# Patient Record
Sex: MALE
[De-identification: names, ages, dates, MRNs, and addresses within clinical notes are randomized per-mention and may not be internally consistent; named-entity substitution may affect disease eponyms.]

---

## 2019-02-12 ENCOUNTER — HOSPITAL ENCOUNTER (EMERGENCY)
Dept: HOSPITAL 14 - H.ER | Age: 34
Discharge: LEFT BEFORE DISCHARGE | End: 2019-02-12
Payer: SELF-PAY

## 2019-02-12 VITALS
OXYGEN SATURATION: 100 % | DIASTOLIC BLOOD PRESSURE: 63 MMHG | SYSTOLIC BLOOD PRESSURE: 126 MMHG | TEMPERATURE: 98.2 F | HEART RATE: 51 BPM | RESPIRATION RATE: 17 BRPM

## 2019-02-12 DIAGNOSIS — Z02.89: Primary | ICD-10-CM

## 2019-02-13 ENCOUNTER — HOSPITAL ENCOUNTER (EMERGENCY)
Dept: HOSPITAL 14 - H.ER | Age: 34
Discharge: HOME | End: 2019-02-13
Payer: COMMERCIAL

## 2019-02-13 VITALS
DIASTOLIC BLOOD PRESSURE: 66 MMHG | SYSTOLIC BLOOD PRESSURE: 125 MMHG | HEART RATE: 62 BPM | TEMPERATURE: 98 F | RESPIRATION RATE: 18 BRPM

## 2019-02-13 VITALS — OXYGEN SATURATION: 98 %

## 2019-02-13 DIAGNOSIS — R10.9: Primary | ICD-10-CM

## 2019-02-13 LAB
ALBUMIN SERPL-MCNC: 4.4 G/DL (ref 3.5–5)
ALBUMIN/GLOB SERPL: 1.6 {RATIO} (ref 1–2.1)
ALT SERPL-CCNC: 26 U/L (ref 21–72)
AST SERPL-CCNC: 33 U/L (ref 17–59)
BACTERIA #/AREA URNS HPF: (no result) /[HPF]
BASOPHILS # BLD AUTO: 0 K/UL (ref 0–0.2)
BASOPHILS NFR BLD: 0.5 % (ref 0–2)
BILIRUB UR-MCNC: NEGATIVE MG/DL
BUN SERPL-MCNC: 14 MG/DL (ref 9–20)
CALCIUM SERPL-MCNC: 9.3 MG/DL (ref 8.4–10.2)
COLOR UR: YELLOW
EOSINOPHIL # BLD AUTO: 0 K/UL (ref 0–0.7)
EOSINOPHIL NFR BLD: 0.7 % (ref 0–4)
ERYTHROCYTE [DISTWIDTH] IN BLOOD BY AUTOMATED COUNT: 12.6 % (ref 11.5–14.5)
GFR NON-AFRICAN AMERICAN: > 60
GLUCOSE UR STRIP-MCNC: (no result) MG/DL
HGB BLD-MCNC: 14.9 G/DL (ref 12–18)
LEUKOCYTE ESTERASE UR-ACNC: (no result) LEU/UL
LYMPHOCYTES # BLD AUTO: 1.4 K/UL (ref 1–4.3)
LYMPHOCYTES NFR BLD AUTO: 24.2 % (ref 20–40)
MCH RBC QN AUTO: 31 PG (ref 27–31)
MCHC RBC AUTO-ENTMCNC: 34.2 G/DL (ref 33–37)
MCV RBC AUTO: 90.5 FL (ref 80–94)
MONOCYTES # BLD: 0.4 K/UL (ref 0–0.8)
MONOCYTES NFR BLD: 7.7 % (ref 0–10)
NEUTROPHILS # BLD: 3.8 K/UL (ref 1.8–7)
NEUTROPHILS NFR BLD AUTO: 66.9 % (ref 50–75)
NRBC BLD AUTO-RTO: 0 % (ref 0–0)
PH UR STRIP: 8 [PH] (ref 5–8)
PLATELET # BLD: 206 K/UL (ref 130–400)
PMV BLD AUTO: 8.6 FL (ref 7.2–11.7)
PROT UR STRIP-MCNC: NEGATIVE MG/DL
RBC # BLD AUTO: 4.8 MIL/UL (ref 4.4–5.9)
RBC # UR STRIP: NEGATIVE /UL
SP GR UR STRIP: 1.02 (ref 1–1.03)
URINE CLARITY: (no result)
UROBILINOGEN UR-MCNC: (no result) MG/DL (ref 0.2–1)
WBC # BLD AUTO: 5.7 K/UL (ref 4.8–10.8)

## 2019-02-13 PROCEDURE — 87086 URINE CULTURE/COLONY COUNT: CPT

## 2019-02-13 PROCEDURE — 85025 COMPLETE CBC W/AUTO DIFF WBC: CPT

## 2019-02-13 PROCEDURE — 99285 EMERGENCY DEPT VISIT HI MDM: CPT

## 2019-02-13 PROCEDURE — 87491 CHLMYD TRACH DNA AMP PROBE: CPT

## 2019-02-13 PROCEDURE — 87591 N.GONORRHOEAE DNA AMP PROB: CPT

## 2019-02-13 PROCEDURE — 80053 COMPREHEN METABOLIC PANEL: CPT

## 2019-02-13 PROCEDURE — 74176 CT ABD & PELVIS W/O CONTRAST: CPT

## 2019-02-13 PROCEDURE — 96360 HYDRATION IV INFUSION INIT: CPT

## 2019-02-13 PROCEDURE — 81003 URINALYSIS AUTO W/O SCOPE: CPT

## 2019-02-13 NOTE — ED PDOC
HPI: Abdomen


Time Seen by Provider: 02/13/19 21:07


Chief Complaint (Nursing): Back Pain


Chief Complaint (Provider): flank pain


History Per: Patient


History/Exam Limitations: no limitations


Onset/Duration Of Symptoms: Days (1 week), Waxing/Waning


Current Symptoms Are (Timing): Still Present


Associated Symptoms: Urinary Symptoms


Additional Complaint(s): 





34 y/o male presents for evaluation of intermittent left flank pain x 1 week.  

Associated "warmness" when he urinates.  Denies fever, nausea/vomiting, chest 

pain, shortness of breath, palpitations, changes in bowel movements, hematuria, 

testicular pain/swelling, penile discharge. 


Patient states he recently found out his girlfriend was cheating on him, unknown

if symptoms related











Past Medical History


Reviewed: Historical Data, Nursing Documentation, Vital Signs


Vital Signs: 





                                Last Vital Signs











Temp  98.4 F   02/13/19 20:52


 


Pulse  60   02/13/19 20:52


 


Resp  16   02/13/19 20:52


 


BP  114/56 L  02/13/19 20:52


 


Pulse Ox  98   02/13/19 20:52














- Medical History


PMH: No Chronic Diseases





- Surgical History


Surgical History: No Surg Hx





- Family History


Family History: States: No Known Family Hx





- Social History


Current smoker - smoking cessation education provided: No


Alcohol: Social





- Home Medications


Home Medications: 


                                Ambulatory Orders











 Medication  Instructions  Recorded


 


Naproxen [Naprosyn] 500 mg PO Q12 PRN #20 tablet 02/13/19














- Allergies


Allergies/Adverse Reactions: 


                                    Allergies











Allergy/AdvReac Type Severity Reaction Status Date / Time


 


No Known Allergies Allergy   Verified 02/13/19 20:52














Review of Systems


ROS Statement: Except As Marked, All Systems Reviewed And Found Negative


Gastrointestinal: Positive for: Abdominal Pain (left flank)





Physical Exam





- Reviewed


Nursing Documentation Reviewed: Yes


Vital Signs Reviewed: Yes





- Physical Exam


Appears: Positive for: Well, Non-toxic, No Acute Distress


Head Exam: Positive for: ATRAUMATIC, NORMAL INSPECTION, NORMOCEPHALIC


Skin: Positive for: Normal Color


Eye Exam: Positive for: Normal appearance


ENT: Positive for: Normal ENT Inspection


Cardiovascular/Chest: Positive for: Regular Rate, Rhythm


Respiratory: Positive for: Normal Breath Sounds


Gastrointestinal/Abdominal: Positive for: Bowel Sounds, Soft.  Negative for: 

Tenderness


Back: Positive for: Normal Inspection.  Negative for: L CVA Tenderness, R CVA 

Tenderness


Extremity: Positive for: Normal ROM


Neurologic/Psych: Positive for: Alert, Oriented (x3)





- Laboratory Results


Result Diagrams: 


                                 02/13/19 21:50





                                 02/13/19 21:50





- ECG


O2 Sat by Pulse Oximetry: 98





- Progress


ED Course And Treament: 





-cbc


-cmp


-lipase


-urinalysis


-gc/chlamydia


-IV NS bolus


-ct renal protocol





EXAM: 


CT Abdomen and Pelvis without IV contrast 





CLINICAL HISTORY: 


Left flank pain 





TECHNIQUE: 


Axial computed tomography images of the abdomen and pelvis without intravenous 

contrast. 


210.54 mGy-cm 





CONTRAST: 


Without 





COMPARISON: 


None provided. 





FINDINGS: 





LUNG BASES: 


The lung bases appear clear. No pleural effusions are seen. 





LIVER: 


Unremarkable. 





GALLBLADDER AND BILE DUCTS: 


The gallbladder appears within normal limits. No radioopaque gallstones are 

seen. No biliary ductal dilatation is evident. 





PANCREAS: 


Unremarkable. 





SPLEEN: 


Unremarkable. 





ADRENAL GLANDS: 


Unremarkable. 





KIDNEYS, URETERS, AND BLADDER: 


The kidneys appear within normal limits. There is no hydronephrosis or 

hydroureter. No urinary calculi are seen. 





STOMACH AND BOWEL: 


Unremarkable appearance of the stomach and bowel. No evidence of bowel obstructi

on. No evidence suggesting enteritis or colitis. 





APPENDIX: 


No evidence of acute appendicitis on CT examination. 





PERITONEUM: 


No free fluid. No free air. 





LYMPH NODES: 


No lymphadenopathy is evident. 





REPRODUCTIVE: 


Unremarkable as visualized. 





VASCULATURE: 


No evidence of abdominal aortic aneurysm. 





BONES: 


No aggressive appearing osseous lesion. No acute osseous pathology evident. 





IMPRESSION: 





No acute intra-abdominal or pelvic abnormality








Patient educated on findings, discharged with rx Naproxen


Advised follow up PMD within 2-3 days


Patient offered STD prophylaxis but states he will wait for results


Return precautions given














Disposition





- Clinical Impression


Clinical Impression: 


 Left flank pain








- Patient ED Disposition


Is Patient to be Admitted: No


Counseled Patient/Family Regarding: Studies Performed, Diagnosis, Need For Fol

lowup, Rx Given





- Disposition


Disposition: Routine/Home


Disposition Time: 23:34


Condition: IMPROVED


Prescriptions: 


Naproxen [Naprosyn] 500 mg PO Q12 PRN #20 tablet


 PRN Reason: Pain, Moderate (4-7)


Instructions:  Flank Pain


Forms:  [facility] (English)

## 2019-02-14 NOTE — CT
Date of service: 



02/13/2019



PROCEDURE:  CT Abdomen and Pelvis without intravenous contrast



HISTORY:

left flank pain



COMPARISON:

None.



TECHNIQUE:

CT scan of the abdomen and pelvis was performed without 

administration of intravenous contrast.  Oral contrast was not 

administered.  Coronal and sagittal reformatted images were 

obtained.. 



Radiation dose:



Total exam DLP = 210.54 mGy-cm.



This CT exam was performed using one or more of the following dose 

reduction techniques: Automated exposure control, adjustment of the 

mA and/or kV according to patient size, and/or use of iterative 

reconstruction technique.



FINDINGS:



LOWER THORAX:

The visualized lungs are clear. 



LIVER:

Normal in size.  No gross lesion or ductal dilatation.  



GALLBLADDER AND BILE DUCTS:

The gallbladder is contracted. 



PANCREAS:

Normal in size.  No gross lesion or ductal dilatation.



SPLEEN:

Normal in size. 



ADRENALS:

No discrete nodule. 



KIDNEYS AND URETERS:

Normal in size without nephrolithiasis.  No hydronephrosis. No solid 

mass. 



VASCULATURE:

No aortic aneurysm. No aortic atherosclerotic calcification or mural 

plaque present.



BOWEL:

The small bowel loops are normal in caliber.  No obstruction. No 

gross mural thickening. 



APPENDIX:

Normal appendix. 



PERITONEUM:

No free fluid. No free air. 



LYMPH NODES:

No enlarged lymph nodes. 



BLADDER:

Partially decompressed. 



REPRODUCTIVE:

The prostate gland is normal in size. 



BONES:

No acute fracture.  Within normal limits for the patient's age. 



OTHER FINDINGS:

None.



IMPRESSION:

No acute abdominal or pelvic abnormality.



A preliminary report was provided by [employer].